# Patient Record
Sex: FEMALE | Race: WHITE
[De-identification: names, ages, dates, MRNs, and addresses within clinical notes are randomized per-mention and may not be internally consistent; named-entity substitution may affect disease eponyms.]

---

## 2017-07-03 ENCOUNTER — HOSPITAL ENCOUNTER (OUTPATIENT)
Dept: HOSPITAL 47 - RADMAMWWP | Age: 57
Discharge: HOME | End: 2017-07-03
Attending: INTERNAL MEDICINE
Payer: COMMERCIAL

## 2017-07-03 DIAGNOSIS — Z12.31: Primary | ICD-10-CM

## 2017-07-06 NOTE — MM
Reason for exam: screening  (asymptomatic).

Last mammogram was performed 1 year ago.



History:

Patient is postmenopausal.

Family history of breast cancer in mother at age 70 and breast cancer in maternal 

aunt at age 65.

Benign US breast aspiration ea add LT of the left breast, July 15, 2016.  Benign 

US breast aspiration single LT of the left breast, July 15, 2016.



Physical Findings:

A clinical breast exam by your physician is recommended on an annual basis and 

results should be correlated with mammographic findings.



MG Screening Mammo w CAD

Bilateral CC and MLO view(s) were taken.

Prior study comparison: July 15, 2016, left breast MG diagnostic mammo LT wo CAD.

There are scattered fibroglandular densities.  Previous mammotome biopsy in the 

left breast.  No significant changes when compared with prior studies.





ASSESSMENT: Benign, BI-RAD 2



RECOMMENDATION:

Routine screening mammogram of both breasts in 1 year.

## 2018-07-20 ENCOUNTER — HOSPITAL ENCOUNTER (OUTPATIENT)
Dept: HOSPITAL 47 - RADMAMWWP | Age: 58
Discharge: HOME | End: 2018-07-20
Payer: COMMERCIAL

## 2018-07-20 DIAGNOSIS — Z12.31: Primary | ICD-10-CM

## 2018-07-20 PROCEDURE — 77063 BREAST TOMOSYNTHESIS BI: CPT

## 2018-07-20 PROCEDURE — 77067 SCR MAMMO BI INCL CAD: CPT

## 2018-07-24 NOTE — MM
Reason for exam: screening  (asymptomatic).

Last mammogram was performed 1 year and 1 month ago.



History:

Patient is postmenopausal.

Family history of breast cancer in mother at age 70 and breast cancer in maternal 

aunt at age 65.

Benign US breast aspiration ea add LT of the left breast, July 15, 2016.  Benign 

US breast aspiration single LT of the left breast, July 15, 2016.



Physical Findings:

A clinical breast exam by your physician is recommended on an annual basis and 

results should be correlated with mammographic findings.



MG 3D Screening Mammo W/Cad

Bilateral CC and MLO view(s) were taken.

Prior study comparison: July 3, 2017, bilateral MG screening mammo w CAD.  July 

15, 2016, left breast MG diagnostic mammo LT wo CAD.

There are scattered fibroglandular densities.  Previous mammotome biopsy in the 

left breast. Two small developing oil cysts on the left.  No significant changes 

when compared with prior studies.





ASSESSMENT: Negative, BI-RAD 1



RECOMMENDATION:

Routine screening mammogram of both breasts in 1 year.

## 2019-08-02 ENCOUNTER — HOSPITAL ENCOUNTER (OUTPATIENT)
Dept: HOSPITAL 47 - RADMAMWWP | Age: 59
Discharge: HOME | End: 2019-08-02
Payer: COMMERCIAL

## 2019-08-02 DIAGNOSIS — Z12.31: Primary | ICD-10-CM

## 2019-08-02 PROCEDURE — 77067 SCR MAMMO BI INCL CAD: CPT

## 2019-08-05 NOTE — MM
Reason for exam: screening  (asymptomatic).

Last mammogram was performed 1 year ago.



History:

Patient is postmenopausal.

Family history of breast cancer in mother at age 70 and breast cancer in maternal 

aunt at age 65.

Benign US breast aspiration ea add LT of the left breast, July 15, 2016.  Benign 

US breast aspiration single LT of the left breast, July 15, 2016.



Physical Findings:

A clinical breast exam by your physician is recommended on an annual basis and 

results should be correlated with mammographic findings.



MG Screening Mammo w CAD

Bilateral CC and MLO view(s) were taken.

Prior study comparison: July 20, 2018, bilateral MG 3d screening mammo w/cad.  

July 3, 2017, bilateral MG screening mammo w CAD.

There are scattered fibroglandular densities.  There are benign appearing round 

calcifications bilaterally. Previous mammotome biopsy in the left breast. There is

no discrete abnormality.





ASSESSMENT: Benign, BI-RAD 2



RECOMMENDATION:

Routine screening mammogram of both breasts in 1 year.

## 2020-02-14 ENCOUNTER — HOSPITAL ENCOUNTER (OUTPATIENT)
Dept: HOSPITAL 47 - RADUSWWP | Age: 60
Discharge: HOME | End: 2020-02-14
Payer: COMMERCIAL

## 2020-02-14 DIAGNOSIS — K21.9: Primary | ICD-10-CM

## 2020-02-14 PROCEDURE — 74220 X-RAY XM ESOPHAGUS 1CNTRST: CPT

## 2020-02-14 NOTE — FL
EXAMINATION TYPE: FL barium swallow

 

DATE OF EXAM: 2/14/2020

 

CLINICAL HISTORY: Dysphagia

 

TECHNIQUE:  A double contrast esophagram is performed utilizing air and barium.  A total of 1 minute 
and 30 seconds of fluoroscopic time was utilized during procedure. 37 fluoroscopic images were saved 
during the examination.

 

COMPARISON: None

 

FINDINGS: The esophagus shows normal motility and emptying into the stomach.  No evidence of hiatal h
ernia or stricture noted. Minimal gastroesophageal reflux was seen during real time performance of th
is study. There is slight impression upon the posterior esophagus at C5-C6 and C6-C7 by anterior proj
ecting cervical spine osteophytes. Incidentally noted minimal vallecular retention is also seen, wallace
red with water.

 

IMPRESSION:

1. Mild gastroesophageal reflux.

2. Incidentally noted mild vallecular retention, cleared with water.

3. Slight impression on the posterior cervical esophagus by anterior projecting osteophytes at C5-C6 
and C6-C7.

## 2020-09-09 ENCOUNTER — HOSPITAL ENCOUNTER (OUTPATIENT)
Dept: HOSPITAL 47 - RADMAMWWP | Age: 60
Discharge: HOME | End: 2020-09-09
Payer: COMMERCIAL

## 2020-09-09 DIAGNOSIS — Z12.31: Primary | ICD-10-CM

## 2020-09-09 PROCEDURE — 77067 SCR MAMMO BI INCL CAD: CPT

## 2020-09-10 NOTE — MM
Reason for exam: screening  (asymptomatic).

Last mammogram was performed 1 year and 1 month ago.



History:

Patient is postmenopausal.

Family history of breast cancer in mother at age 70 and breast cancer in maternal 

aunt at age 65.

Benign US breast aspiration ea add LT of the left breast, July 15, 2016.  Benign 

US breast aspiration single LT of the left breast, July 15, 2016.



Physical Findings:

A clinical breast exam by your physician is recommended on an annual basis and 

results should be correlated with mammographic findings.



MG Screening Mammo w CAD

Bilateral CC and MLO view(s) were taken.

Prior study comparison: August 2, 2019, bilateral MG screening mammo w CAD.  July 20, 2018, bilateral MG 3d screening mammo w/cad.

There are scattered fibroglandular densities.  Stable benign calcifications. There

is no discrete abnormality.  No significant changes when compared with prior 

studies.





ASSESSMENT: Benign, BI-RAD 2



RECOMMENDATION:

Routine screening mammogram of both breasts in 1 year.

## 2021-08-06 ENCOUNTER — HOSPITAL ENCOUNTER (OUTPATIENT)
Dept: HOSPITAL 47 - RADXRMAIN | Age: 61
Discharge: HOME | End: 2021-08-06
Payer: COMMERCIAL

## 2021-08-06 DIAGNOSIS — M51.36: Primary | ICD-10-CM

## 2021-08-06 DIAGNOSIS — M79.605: ICD-10-CM

## 2021-08-06 DIAGNOSIS — M43.16: ICD-10-CM

## 2021-08-06 DIAGNOSIS — M79.606: ICD-10-CM

## 2021-08-06 PROCEDURE — 72100 X-RAY EXAM L-S SPINE 2/3 VWS: CPT

## 2021-08-06 PROCEDURE — 73521 X-RAY EXAM HIPS BI 2 VIEWS: CPT

## 2021-08-07 NOTE — XR
EXAMINATION TYPE: XR lumbar spine 2 or 3V

 

DATE OF EXAM: 8/6/2021

 

COMPARISON: None

 

HISTORY: Pain bilateral legs

 

TECHNIQUE: 3 view lumbar spine

 

FINDINGS: There are 5 lumbar-type vertebral bodies. The pedicles are intact. There is a grade 1 spond
ylolisthesis of L4 anteriorly on L5. Disc space narrowing is present L4-5 and L5-S1. Some mild poster
ior disc space narrowing is present L2-3 L3-4.

 

IMPRESSION:

1.  Grade 1 spondylolisthesis of L4 anterior to L5.

2. Mild degenerative disc changes lower lumbar spine

## 2021-08-07 NOTE — XR
EXAMINATION TYPE: XR Hip Bilateral Complete

 

DATE OF EXAM: 8/6/2021

 

COMPARISON: None

 

HISTORY: Bilateral leg pain

 

TECHNIQUE: Bilateral hips 2 views.

 

FINDINGS: Femoral heads articulate with the acetabulum. Joint spaces are preserved. No acute fracture
 or dislocation is evident.

 

IMPRESSION:

1.  Normal 2 view bilateral hips

## 2021-09-10 ENCOUNTER — HOSPITAL ENCOUNTER (OUTPATIENT)
Dept: HOSPITAL 47 - RADMAMWWP | Age: 61
Discharge: HOME | End: 2021-09-10
Attending: INTERNAL MEDICINE
Payer: COMMERCIAL

## 2021-09-10 DIAGNOSIS — Z12.31: Primary | ICD-10-CM

## 2021-09-10 DIAGNOSIS — Z80.3: ICD-10-CM

## 2021-09-10 DIAGNOSIS — Z78.0: ICD-10-CM

## 2021-09-10 PROCEDURE — 77067 SCR MAMMO BI INCL CAD: CPT

## 2021-09-13 NOTE — MM
Reason for exam: screening  (asymptomatic).

Last mammogram was performed 1 year ago.



History:

Patient is postmenopausal.

Family history of breast cancer in mother at age 70 and breast cancer in maternal 

aunt at age 65.

Benign US breast aspiration ea add LT of the left breast, July 15, 2016.  Benign 

US breast aspiration single LT of the left breast, July 15, 2016.



Physical Findings:

A clinical breast exam by your physician is recommended on an annual basis and 

results should be correlated with mammographic findings.



MG Screening Mammo w CAD

Bilateral CC and MLO view(s) were taken.

Prior study comparison: September 9, 2020, bilateral MG screening mammo w CAD.  

August 2, 2019, bilateral MG screening mammo w CAD.  July 20, 2018, bilateral MG 

3d screening mammo w/cad.

There are scattered fibroglandular densities.  There are benign appearing round, 

dystrophic calcifications bilaterally. Previous mammotome biopsy in the left 

breast. There is no discrete abnormality.





ASSESSMENT: Benign, BI-RAD 2



RECOMMENDATION:

Routine screening mammogram of both breasts in 1 year.

## 2022-09-16 ENCOUNTER — HOSPITAL ENCOUNTER (OUTPATIENT)
Dept: HOSPITAL 47 - RADMAMWWP | Age: 62
Discharge: HOME | End: 2022-09-16
Attending: INTERNAL MEDICINE
Payer: COMMERCIAL

## 2022-09-16 DIAGNOSIS — Z80.3: ICD-10-CM

## 2022-09-16 DIAGNOSIS — Z78.0: ICD-10-CM

## 2022-09-16 DIAGNOSIS — Z12.31: Primary | ICD-10-CM

## 2022-09-16 PROCEDURE — 77067 SCR MAMMO BI INCL CAD: CPT

## 2022-09-20 NOTE — MM
Reason for Exam: Screening  (asymptomatic). 

Last screening mammogram was performed 12 month(s) ago.





Patient History: 

Menarche at age 13. First Full-Term Pregnancy at age 16. Right ovary removed at age 30. Hysterectomy

at age 30. Postmenopausal. 7/15/2016, Benign Cyst Aspiration on the left side. 7/15/2016, Benign

Cyst Aspiration on the left side.

Maternal aunt had breast cancer, age 65. Mother had breast cancer, age 70. 





Risk Values: 

Lynda 5 year model risk: 2.8%.

NCI Lifetime model risk: 12.9%.





Prior Study Comparison: 

8/2/2019 Bilateral Screening Mammogram, [facility]. 9/9/2020 Bilateral Screening Mammogram, [facility]. 9/10/2021

Bilateral Screening Mammogram, [facility]. 





Tissue Density: 

The breast tissue is almost entirely fat.





Findings: 

Analyzed By CAD. 

There is no suspicious group of microcalcifications or new suspicious mass in either breast.

Benign-appearing calcifications bilaterally. 





Overall Assessment: Benign, BI-RAD 2





Management: 

Screening Mammogram of both breasts in 1 year.

A clinical breast exam by your physician is recommended on an annual basis and results should be

correlated with mammographic findings.  [facility] will attempt to contact patient to

return for supplemental views and ultrasound if indicated.



Electronically signed and approved by: Jaden Daly DO

## 2024-09-27 ENCOUNTER — HOSPITAL ENCOUNTER (OUTPATIENT)
Dept: HOSPITAL 47 - RADMAMWWP | Age: 64
Discharge: HOME | End: 2024-09-27
Attending: INTERNAL MEDICINE
Payer: COMMERCIAL

## 2024-09-27 PROCEDURE — 77067 SCR MAMMO BI INCL CAD: CPT

## 2024-09-27 PROCEDURE — 77063 BREAST TOMOSYNTHESIS BI: CPT

## 2024-09-29 NOTE — MM
Reason for Exam: Screening  (asymptomatic). 

Last screening mammogram was performed 12 month(s) ago.





Patient History: 

Menarche at age 13. First Full-Term Pregnancy at age 16. Right ovary removed at age 30. Hysterectomy

at age 30. Postmenopausal. 7/15/2016, Benign Cyst Aspiration on the left side. 7/15/2016, Benign

Cyst Aspiration on the left side.

Maternal aunt had breast cancer, age 65. Mother had breast cancer, age 70. 





Risk Values: 

Lynda 5 year model risk: 3.0%.

NCI Lifetime model risk: 11.8%.





Prior Study Comparison: 

9/10/2021 Bilateral Screening Mammogram, [facility]. 9/16/2022 Bilateral MG screening mammo w CAD, [facility].

9/25/2023 Bilateral MG screening mammo w CAD, [facility]. 





Tissue Density: 

There are scattered areas of fibroglandular density.





Findings: 

Analyzed By CAD. 

The pattern is symmetrical.

No significant interval change is evident. Benign calcifications are present bilaterally. Core

marker is within the left breast.

No suspicious groups of microcalcifications, spiculated or lobular masses, architectural distortion

or other secondary signs of malignancy are mammographically apparent. 





Overall Assessment: Benign, BI-RAD 2





Management: 

Screening Mammogram of both breasts in 1 year.

A negative mammogram report should not preclude additional follow up of suspicious palpable

abnormalities.

Patient should continue monthly self breast exam.

A clinical breast exam by your physician is recommended on an annual basis and results should be

correlated with mammographic findings.



Note on Lynda scores and lifetime risk:

1. A Lynda score greater than 3% is considered moderate risk. If this is the case, consider

specialist referral to assess eligibility for a risk reducing agent.

2. If overall lifetime risk for the development of breast cancer is 20% or higher, the patient may

qualify for future screening with alternating mammogram and breast MRI.



[facility], Workstation: [id number], 9/29/2024 10:18 PM.



Electronically signed and approved by: Raul Lyles D.O. Radiologis